# Patient Record
Sex: FEMALE | Race: WHITE | NOT HISPANIC OR LATINO | URBAN - METROPOLITAN AREA
[De-identification: names, ages, dates, MRNs, and addresses within clinical notes are randomized per-mention and may not be internally consistent; named-entity substitution may affect disease eponyms.]

---

## 2022-05-06 ENCOUNTER — OFFICE VISIT (OUTPATIENT)
Dept: URBAN - METROPOLITAN AREA CLINIC 63 | Facility: CLINIC | Age: 57
End: 2022-05-06

## 2022-06-15 ENCOUNTER — OFFICE VISIT (OUTPATIENT)
Dept: URBAN - METROPOLITAN AREA SURGERY CENTER 4 | Facility: SURGERY CENTER | Age: 57
End: 2022-06-15

## 2022-06-20 LAB — PATHOLOGY (INDENTED REPORT): (no result)

## 2022-06-29 ENCOUNTER — DASHBOARD ENCOUNTERS (OUTPATIENT)
Age: 57
End: 2022-06-29

## 2022-06-29 ENCOUNTER — WEB ENCOUNTER (OUTPATIENT)
Dept: URBAN - METROPOLITAN AREA CLINIC 63 | Facility: CLINIC | Age: 57
End: 2022-06-29

## 2022-06-29 ENCOUNTER — OFFICE VISIT (OUTPATIENT)
Dept: URBAN - METROPOLITAN AREA CLINIC 63 | Facility: CLINIC | Age: 57
End: 2022-06-29
Payer: COMMERCIAL

## 2022-06-29 VITALS
OXYGEN SATURATION: 99 % | SYSTOLIC BLOOD PRESSURE: 135 MMHG | BODY MASS INDEX: 23.64 KG/M2 | TEMPERATURE: 96.3 F | HEIGHT: 60 IN | WEIGHT: 120.4 LBS | DIASTOLIC BLOOD PRESSURE: 80 MMHG | HEART RATE: 64 BPM

## 2022-06-29 DIAGNOSIS — K64.1 GRADE II HEMORRHOIDS: ICD-10-CM

## 2022-06-29 DIAGNOSIS — Q40.1 CONGENITAL HIATUS HERNIA: ICD-10-CM

## 2022-06-29 DIAGNOSIS — K21.00 GASTRO-ESOPHAGEAL REFLUX DISEASE WITH ESOPHAGITIS, WITHOUT BLEEDING: ICD-10-CM

## 2022-06-29 PROBLEM — 47028006 CONGENITAL HIATUS HERNIA: Status: ACTIVE | Noted: 2022-06-29

## 2022-06-29 PROCEDURE — 99213 OFFICE O/P EST LOW 20 MIN: CPT | Performed by: NURSE PRACTITIONER

## 2022-06-29 RX ORDER — IBUPROFEN 200 MG/1
1 TABLET WITH FOOD OR MILK AS NEEDED TABLET, FILM COATED ORAL THREE TIMES A DAY
Status: ON HOLD | COMMUNITY

## 2022-06-29 RX ORDER — IVERMECTIN 10 MG/G
CREAM TOPICAL
Qty: 45 | Refills: 1 | Status: ACTIVE | COMMUNITY

## 2022-06-29 RX ORDER — LORATADINE 10 MG/1
1 TABLET TABLET ORAL ONCE A DAY
Status: ACTIVE | COMMUNITY

## 2022-06-29 RX ORDER — OMEPRAZOLE 20 MG/1
1 CAPSULE 30 MINUTES BEFORE MORNING MEAL CAPSULE, DELAYED RELEASE ORAL ONCE A DAY
Refills: 0 | Status: ACTIVE | COMMUNITY

## 2022-06-29 NOTE — HPI-PREVIOUS PROCEDURES
EGD with dilation/15 Sophia 2022.  Esophageal stenosis was dilated with a 54 [employer] Eason dilator, encountering moderate resistance.  The dilation site was examined following endoscope reinsertion and showed moderate improvement in luminal narrowing.  A 2 cm hiatal hernia was present.  Evidence of a Nissen fundoplication was found in the cardia.  Minimal gastritis found in the gastric antrum.  Unremarkable duodenum.  All biopsies demonstrate unremarkable mucosa without histopathologic diagnosis.  All remaining findings are negative or benign. ************* Colonoscopy/15 Sophia 2022.  Internal hemorrhoids present.  The examination was otherwise normal on direct and retroflexion views.  No specimens collected.  Recommend repeat colonoscopy in 10 years for colorectal cancer screening purposes. ************* Lab work dated 27 April 2022 demonstrates the following abnormalities: Cholesterol 222, , non-.  All remaining lab values of CBC, CMP, A1c, free T3, TSH and lipid panel are within normal limits.

## 2022-06-29 NOTE — HPI-TODAY'S VISIT:
Jessica Martínez is a very pleasant 57-year-old female seen in follow-up of EGD with dilation and screening colonoscopy (see results below).  She admits to tolerating the procedures very easily without any postprocedure complications.  She has noted improved swallowing since dilation but complains of continued upper esophageal "burning" despite use of omeprazole, 20 mg every morning.  She is otherwise asymptomatic from a GI perspective and relates return to normal bowel habits.

## 2022-07-09 ENCOUNTER — TELEPHONE ENCOUNTER (OUTPATIENT)
Dept: URBAN - METROPOLITAN AREA CLINIC 121 | Facility: CLINIC | Age: 57
End: 2022-07-09

## 2022-07-09 RX ORDER — OMEPRAZOLE 40 MG/1
ONCE A DAY CAPSULE, DELAYED RELEASE ORAL ONCE A DAY
Refills: 3 | OUTPATIENT
Start: 2015-12-16 | End: 2015-12-16

## 2022-07-09 RX ORDER — RIFAXIMIN 550 MG/1
THREE TIMES A DAY TABLET ORAL THREE TIMES A DAY
Refills: 2 | OUTPATIENT
Start: 2017-03-08 | End: 2022-05-06

## 2022-07-09 RX ORDER — OMEPRAZOLE 40 MG/1
CAPSULE, DELAYED RELEASE ORAL ONCE A DAY
Refills: 3 | OUTPATIENT
Start: 2011-11-21 | End: 2011-11-21

## 2022-07-09 RX ORDER — RIFAXIMIN 550 MG/1
ONCE A DAY TABLET ORAL ONCE A DAY
Refills: 0 | OUTPATIENT
Start: 2016-11-15 | End: 2017-02-07

## 2022-07-09 RX ORDER — EUCALYPTUS OIL/MENTHOL/CAMPHOR 1.2%-4.8%
OINTMENT (GRAM) TOPICAL
Refills: 0 | OUTPATIENT
Start: 2011-09-13 | End: 2013-09-16

## 2022-07-09 RX ORDER — OMEPRAZOLE 40 MG/1
ONCE A DAY CAPSULE, DELAYED RELEASE ORAL ONCE A DAY
Refills: 3 | OUTPATIENT
Start: 2015-12-17 | End: 2016-12-08

## 2022-07-09 RX ORDER — OMEPRAZOLE 40 MG/1
DO NOT CHEW OR CRUSH. SWALLOW WHOLE CAPSULE, DELAYED RELEASE ORAL ONCE A DAY
Refills: 3 | OUTPATIENT
Start: 2013-09-16 | End: 2014-01-10

## 2022-07-09 RX ORDER — OMEPRAZOLE 40 MG/1
CAPSULE, DELAYED RELEASE ORAL
Refills: 0 | OUTPATIENT
Start: 2013-09-16 | End: 2013-10-01

## 2022-07-09 RX ORDER — DOXYCYCLINE HYCLATE 100 MG/1
TABLET ORAL
Refills: 0 | OUTPATIENT
Start: 2022-03-29 | End: 2022-05-06

## 2022-07-09 RX ORDER — CHROMIUM 200 MCG
TABLET ORAL
Refills: 0 | OUTPATIENT
Start: 2011-09-13 | End: 2013-09-16

## 2022-07-09 RX ORDER — OMEPRAZOLE 40 MG/1
DO NOT CHEW OR CRUSH. SWALLOW WHOLE CAPSULE, DELAYED RELEASE ORAL TWICE A DAY
Refills: 3 | OUTPATIENT
Start: 2014-01-10 | End: 2014-01-10

## 2022-07-09 RX ORDER — DEXLANSOPRAZOLE 60 MG/1
CAPSULE, DELAYED RELEASE ORAL
Refills: 0 | OUTPATIENT
Start: 2011-09-13 | End: 2011-10-28

## 2022-07-10 ENCOUNTER — TELEPHONE ENCOUNTER (OUTPATIENT)
Dept: URBAN - METROPOLITAN AREA CLINIC 121 | Facility: CLINIC | Age: 57
End: 2022-07-10

## 2022-07-10 RX ORDER — LUBIPROSTONE 8 UG/1
1 PO BID CAPSULE, GELATIN COATED ORAL TWICE A DAY
Refills: 3 | Status: ACTIVE | COMMUNITY
Start: 2011-11-09

## 2022-07-10 RX ORDER — OMEPRAZOLE 40 MG/1
TAKE 1 CAPSULE EVERY DAY CAPSULE, DELAYED RELEASE ORAL
Refills: 1 | Status: ACTIVE | COMMUNITY
Start: 2016-12-08

## 2022-07-10 RX ORDER — OMEPRAZOLE 40 MG/1
CAPSULE, DELAYED RELEASE ORAL TWICE A DAY
Refills: 3 | Status: ACTIVE | COMMUNITY
Start: 2012-03-05

## 2022-07-10 RX ORDER — OMEPRAZOLE 20 MG/1
ONCE A DAY, PO, QAM, BEST TAKEN ON AN EMPTY STOMACH CAPSULE, DELAYED RELEASE ORAL ONCE A DAY
Refills: 3 | Status: ACTIVE | COMMUNITY
Start: 2022-05-06

## 2022-07-10 RX ORDER — RIFAXIMIN 550 MG/1
THREE TIMES A DAY TABLET ORAL THREE TIMES A DAY
Refills: 0 | Status: ACTIVE | COMMUNITY
Start: 2017-02-07

## 2022-07-10 RX ORDER — DEXLANSOPRAZOLE 60 MG/1
CAPSULE, DELAYED RELEASE ORAL ONCE A DAY
Refills: 3 | Status: ACTIVE | COMMUNITY
Start: 2011-09-21

## 2022-07-10 RX ORDER — OMEPRAZOLE 40 MG/1
DO NOT CHEW OR CRUSH. SWALLOW WHOLE CAPSULE, DELAYED RELEASE ORAL TWICE A DAY
Refills: 3 | Status: ACTIVE | COMMUNITY
Start: 2014-01-10

## 2022-07-30 ENCOUNTER — TELEPHONE ENCOUNTER (OUTPATIENT)
Age: 57
End: 2022-07-30

## 2022-07-31 ENCOUNTER — TELEPHONE ENCOUNTER (OUTPATIENT)
Age: 57
End: 2022-07-31

## 2023-04-03 ENCOUNTER — ERX REFILL RESPONSE (OUTPATIENT)
Dept: URBAN - METROPOLITAN AREA CLINIC 63 | Facility: CLINIC | Age: 58
End: 2023-04-03

## 2023-04-03 RX ORDER — OMEPRAZOLE 20 MG/1
1 CAPSULE 30 MINUTES BEFORE MORNING MEAL CAPSULE, DELAYED RELEASE ORAL ONCE A DAY
Refills: 0 | OUTPATIENT

## 2023-04-03 RX ORDER — OMEPRAZOLE 20 MG/1
TAKE 1 CAPSULE BY MOUTH EVERY MORNING BEST ON AN EMPTY STOMACH CAPSULE, DELAYED RELEASE ORAL
Qty: 90 CAPSULE | Refills: 0 | OUTPATIENT